# Patient Record
Sex: FEMALE | Race: WHITE | URBAN - METROPOLITAN AREA
[De-identification: names, ages, dates, MRNs, and addresses within clinical notes are randomized per-mention and may not be internally consistent; named-entity substitution may affect disease eponyms.]

---

## 2017-07-19 RX ORDER — ALBUTEROL SULFATE 90 UG/1
2 AEROSOL, METERED RESPIRATORY (INHALATION) EVERY 4 HOURS PRN
COMMUNITY

## 2017-07-19 RX ORDER — SUMATRIPTAN 6 MG/.5ML
6 INJECTION, SOLUTION SUBCUTANEOUS EVERY 4 HOURS PRN
COMMUNITY

## 2017-07-19 RX ORDER — IBUPROFEN 800 MG/1
1600 TABLET, FILM COATED ORAL EVERY 6 HOURS PRN
COMMUNITY

## 2017-07-19 RX ORDER — ACETAMINOPHEN 500 MG
1000 TABLET ORAL EVERY 6 HOURS PRN
COMMUNITY

## 2017-07-19 RX ORDER — PANTOPRAZOLE SODIUM 40 MG/1
40 TABLET, DELAYED RELEASE ORAL DAILY
COMMUNITY

## 2017-07-19 NOTE — PHARMACY-ADMISSION MEDICATION HISTORY
Admission medication completed by pre-admitting RN. Called patient to check doses, ended up making several changes. Updated list below.  See [facility] admission navigator for allergy information, prior to admission medications and immunization status.     Medication history interview source(s):Patient  Medication history resources (including written lists, pill bottles, clinic record): [facility] list.   Primary pharmacy: [facility] in [place], [place].    Changes made to PTA medication list:  Added: Zonisamide, Imitrex sq, Protonix, Albuterol, Tylenol.  Deleted: Depakote, Omeprazole, Imitrex po  Changed: Ibuprofen    Actions taken by pharmacist (provider contacted, etc):Called Patient at home.     Additional medication history information:None    Medication reconciliation/reorder completed by provider prior to medication history? No      Prior to Admission medications    Medication Sig Last Dose Taking? Auth Provider   ZONISAMIDE PO Take 25 mg by mouth 2 times daily  Yes Unknown, Entered By History   SUMAtriptan (IMITREX) 6 MG/0.5ML injection Inject 6 mg Subcutaneous every 4 hours as needed for migraine (Max 2 doses in 24 hours)  Yes Unknown, Entered By History   ibuprofen (ADVIL/MOTRIN) 800 MG tablet Take 1,600 mg by mouth every 6 hours as needed for moderate pain Yes, patient said she was taking 2 x 800 mg tablets, per her MD. Counseled that 800 mg is usual maximum dose.  Yes Unknown, Entered By History   pantoprazole (PROTONIX) 40 MG EC tablet Take 40 mg by mouth daily  Yes Unknown, Entered By History   albuterol (PROAIR HFA/PROVENTIL HFA/VENTOLIN HFA) 108 (90 BASE) MCG/ACT Inhaler Inhale 2 puffs into the lungs every 4 hours as needed for shortness of breath / dyspnea or wheezing  Yes Unknown, Entered By History   acetaminophen (TYLENOL) 500 MG tablet Take 1,000 mg by mouth every 6 hours as needed for mild pain Max 4000 mg per day from all sources.  Yes Unknown, Entered By History

## 2017-07-21 ENCOUNTER — APPOINTMENT (OUTPATIENT)
Dept: GENERAL RADIOLOGY | Facility: CLINIC | Age: 52
End: 2017-07-21
Attending: ORTHOPAEDIC SURGERY
Payer: MEDICAID

## 2017-07-21 ENCOUNTER — ANESTHESIA EVENT (OUTPATIENT)
Dept: SURGERY | Facility: CLINIC | Age: 52
End: 2017-07-21
Payer: MEDICAID

## 2017-07-21 ENCOUNTER — ANESTHESIA (OUTPATIENT)
Dept: SURGERY | Facility: CLINIC | Age: 52
End: 2017-07-21
Payer: MEDICAID

## 2017-07-21 ENCOUNTER — HOSPITAL ENCOUNTER (OUTPATIENT)
Facility: CLINIC | Age: 52
Setting detail: OBSERVATION
Discharge: HOME | End: 2017-07-22
Attending: ORTHOPAEDIC SURGERY | Admitting: ORTHOPAEDIC SURGERY
Payer: MEDICAID

## 2017-07-21 DIAGNOSIS — I10 ESSENTIAL HYPERTENSION: ICD-10-CM

## 2017-07-21 DIAGNOSIS — M43.22 FUSION OF SPINE OF CERVICAL REGION: Primary | ICD-10-CM

## 2017-07-21 DIAGNOSIS — E78.2 MIXED HYPERLIPIDEMIA: ICD-10-CM

## 2017-07-21 LAB
ABO + RH BLD: NORMAL
ABO + RH BLD: NORMAL
BLD GP AB SCN SERPL QL: NORMAL
BLOOD BANK CMNT PATIENT-IMP: NORMAL
SPECIMEN EXP DATE BLD: NORMAL

## 2017-07-21 PROCEDURE — 93005 ELECTROCARDIOGRAM TRACING: CPT

## 2017-07-21 PROCEDURE — 25000128 H RX IP 250 OP 636: Performed by: INTERNAL MEDICINE

## 2017-07-21 PROCEDURE — 36415 COLL VENOUS BLD VENIPUNCTURE: CPT | Performed by: ANESTHESIOLOGY

## 2017-07-21 PROCEDURE — 25000128 H RX IP 250 OP 636: Performed by: ORTHOPAEDIC SURGERY

## 2017-07-21 PROCEDURE — 99219 ZZC INITIAL OBSERVATION CARE,LEVL II: CPT | Performed by: HOSPITALIST

## 2017-07-21 PROCEDURE — 99207 ZZC CDG-CODE CATEGORY CHANGED: CPT | Performed by: HOSPITALIST

## 2017-07-21 PROCEDURE — 25000128 H RX IP 250 OP 636: Performed by: NURSE ANESTHETIST, CERTIFIED REGISTERED

## 2017-07-21 PROCEDURE — 36000071 ZZH SURGERY LEVEL 5 W FLUORO 1ST 30 MIN: Performed by: ORTHOPAEDIC SURGERY

## 2017-07-21 PROCEDURE — 25000125 ZZHC RX 250: Performed by: ORTHOPAEDIC SURGERY

## 2017-07-21 PROCEDURE — 25000128 H RX IP 250 OP 636: Performed by: ANESTHESIOLOGY

## 2017-07-21 PROCEDURE — 25000125 ZZHC RX 250: Performed by: NURSE ANESTHETIST, CERTIFIED REGISTERED

## 2017-07-21 PROCEDURE — 25000566 ZZH SEVOFLURANE, EA 15 MIN: Performed by: ORTHOPAEDIC SURGERY

## 2017-07-21 PROCEDURE — 25000132 ZZH RX MED GY IP 250 OP 250 PS 637: Performed by: ORTHOPAEDIC SURGERY

## 2017-07-21 PROCEDURE — 40000277 XR SURGERY CARM FLUORO LESS THAN 5 MIN W STILLS: Mod: TC

## 2017-07-21 PROCEDURE — 40000306 ZZH STATISTIC PRE PROC ASSESS II: Performed by: ORTHOPAEDIC SURGERY

## 2017-07-21 PROCEDURE — 71000012 ZZH RECOVERY PHASE 1 LEVEL 1 FIRST HR: Performed by: ORTHOPAEDIC SURGERY

## 2017-07-21 PROCEDURE — 37000008 ZZH ANESTHESIA TECHNICAL FEE, 1ST 30 MIN: Performed by: ORTHOPAEDIC SURGERY

## 2017-07-21 PROCEDURE — 71000013 ZZH RECOVERY PHASE 1 LEVEL 1 EA ADDTL HR: Performed by: ORTHOPAEDIC SURGERY

## 2017-07-21 PROCEDURE — 86900 BLOOD TYPING SEROLOGIC ABO: CPT | Performed by: ANESTHESIOLOGY

## 2017-07-21 PROCEDURE — C1762 CONN TISS, HUMAN(INC FASCIA): HCPCS | Performed by: ORTHOPAEDIC SURGERY

## 2017-07-21 PROCEDURE — 27210995 ZZH RX 272: Performed by: ORTHOPAEDIC SURGERY

## 2017-07-21 PROCEDURE — 86850 RBC ANTIBODY SCREEN: CPT | Performed by: ANESTHESIOLOGY

## 2017-07-21 PROCEDURE — 36000069 ZZH SURGERY LEVEL 5 EA 15 ADDTL MIN: Performed by: ORTHOPAEDIC SURGERY

## 2017-07-21 PROCEDURE — 40000275 ZZH STATISTIC RCP TIME EA 10 MIN

## 2017-07-21 PROCEDURE — 37000009 ZZH ANESTHESIA TECHNICAL FEE, EACH ADDTL 15 MIN: Performed by: ORTHOPAEDIC SURGERY

## 2017-07-21 PROCEDURE — 93010 ELECTROCARDIOGRAM REPORT: CPT | Performed by: INTERNAL MEDICINE

## 2017-07-21 PROCEDURE — G0378 HOSPITAL OBSERVATION PER HR: HCPCS

## 2017-07-21 PROCEDURE — 27210794 ZZH OR GENERAL SUPPLY STERILE: Performed by: ORTHOPAEDIC SURGERY

## 2017-07-21 PROCEDURE — 25000125 ZZHC RX 250: Performed by: ANESTHESIOLOGY

## 2017-07-21 PROCEDURE — 86901 BLOOD TYPING SEROLOGIC RH(D): CPT | Performed by: ANESTHESIOLOGY

## 2017-07-21 PROCEDURE — C1713 ANCHOR/SCREW BN/BN,TIS/BN: HCPCS | Performed by: ORTHOPAEDIC SURGERY

## 2017-07-21 DEVICE — GRAFT BONE PUTTY DBX 01ML 038010: Type: IMPLANTABLE DEVICE | Site: SPINE CERVICAL | Status: FUNCTIONAL

## 2017-07-21 RX ORDER — ACETAMINOPHEN 10 MG/ML
1000 INJECTION, SOLUTION INTRAVENOUS ONCE
Status: COMPLETED | OUTPATIENT
Start: 2017-07-21 | End: 2017-07-21

## 2017-07-21 RX ORDER — KETAMINE HYDROCHLORIDE 50 MG/ML
INJECTION, SOLUTION INTRAMUSCULAR; INTRAVENOUS PRN
Status: DISCONTINUED | OUTPATIENT
Start: 2017-07-21 | End: 2017-07-21

## 2017-07-21 RX ORDER — CEFAZOLIN SODIUM 1 G/3ML
1 INJECTION, POWDER, FOR SOLUTION INTRAMUSCULAR; INTRAVENOUS SEE ADMIN INSTRUCTIONS
Status: DISCONTINUED | OUTPATIENT
Start: 2017-07-21 | End: 2017-07-21 | Stop reason: HOSPADM

## 2017-07-21 RX ORDER — ACETAMINOPHEN 325 MG/1
975 TABLET ORAL EVERY 8 HOURS
Status: DISCONTINUED | OUTPATIENT
Start: 2017-07-21 | End: 2017-07-22 | Stop reason: HOSPADM

## 2017-07-21 RX ORDER — LIDOCAINE HYDROCHLORIDE 10 MG/ML
INJECTION, SOLUTION INFILTRATION; PERINEURAL PRN
Status: DISCONTINUED | OUTPATIENT
Start: 2017-07-21 | End: 2017-07-21

## 2017-07-21 RX ORDER — PROCHLORPERAZINE 25 MG
25 SUPPOSITORY, RECTAL RECTAL EVERY 12 HOURS PRN
Status: DISCONTINUED | OUTPATIENT
Start: 2017-07-21 | End: 2017-07-22 | Stop reason: HOSPADM

## 2017-07-21 RX ORDER — OXYCODONE HYDROCHLORIDE 5 MG/1
5-10 TABLET ORAL
Status: DISCONTINUED | OUTPATIENT
Start: 2017-07-21 | End: 2017-07-22 | Stop reason: HOSPADM

## 2017-07-21 RX ORDER — ONDANSETRON 4 MG/1
4 TABLET, ORALLY DISINTEGRATING ORAL EVERY 6 HOURS PRN
Status: DISCONTINUED | OUTPATIENT
Start: 2017-07-21 | End: 2017-07-22 | Stop reason: HOSPADM

## 2017-07-21 RX ORDER — MORPHINE SULFATE 2 MG/ML
2-4 INJECTION, SOLUTION INTRAMUSCULAR; INTRAVENOUS
Status: DISCONTINUED | OUTPATIENT
Start: 2017-07-21 | End: 2017-07-21

## 2017-07-21 RX ORDER — PROCHLORPERAZINE MALEATE 5 MG
5-10 TABLET ORAL EVERY 6 HOURS PRN
Status: DISCONTINUED | OUTPATIENT
Start: 2017-07-21 | End: 2017-07-22 | Stop reason: HOSPADM

## 2017-07-21 RX ORDER — CEFAZOLIN SODIUM 2 G/100ML
2 INJECTION, SOLUTION INTRAVENOUS
Status: COMPLETED | OUTPATIENT
Start: 2017-07-21 | End: 2017-07-21

## 2017-07-21 RX ORDER — SODIUM CHLORIDE, SODIUM LACTATE, POTASSIUM CHLORIDE, CALCIUM CHLORIDE 600; 310; 30; 20 MG/100ML; MG/100ML; MG/100ML; MG/100ML
INJECTION, SOLUTION INTRAVENOUS CONTINUOUS
Status: DISCONTINUED | OUTPATIENT
Start: 2017-07-21 | End: 2017-07-21 | Stop reason: HOSPADM

## 2017-07-21 RX ORDER — BUPIVACAINE HYDROCHLORIDE AND EPINEPHRINE 2.5; 5 MG/ML; UG/ML
INJECTION, SOLUTION EPIDURAL; INFILTRATION; INTRACAUDAL; PERINEURAL PRN
Status: DISCONTINUED | OUTPATIENT
Start: 2017-07-21 | End: 2017-07-21 | Stop reason: HOSPADM

## 2017-07-21 RX ORDER — NALOXONE HYDROCHLORIDE 0.4 MG/ML
.1-.4 INJECTION, SOLUTION INTRAMUSCULAR; INTRAVENOUS; SUBCUTANEOUS
Status: DISCONTINUED | OUTPATIENT
Start: 2017-07-21 | End: 2017-07-22 | Stop reason: HOSPADM

## 2017-07-21 RX ORDER — HYDROMORPHONE HYDROCHLORIDE 1 MG/ML
.3-.5 INJECTION, SOLUTION INTRAMUSCULAR; INTRAVENOUS; SUBCUTANEOUS
Status: DISCONTINUED | OUTPATIENT
Start: 2017-07-21 | End: 2017-07-22 | Stop reason: HOSPADM

## 2017-07-21 RX ORDER — LIDOCAINE 40 MG/G
CREAM TOPICAL
Status: DISCONTINUED | OUTPATIENT
Start: 2017-07-21 | End: 2017-07-22 | Stop reason: HOSPADM

## 2017-07-21 RX ORDER — SUMATRIPTAN 6 MG/.5ML
6 INJECTION, SOLUTION SUBCUTANEOUS EVERY 4 HOURS PRN
Status: DISCONTINUED | OUTPATIENT
Start: 2017-07-21 | End: 2017-07-22 | Stop reason: HOSPADM

## 2017-07-21 RX ORDER — ONDANSETRON 2 MG/ML
4 INJECTION INTRAMUSCULAR; INTRAVENOUS EVERY 6 HOURS PRN
Status: DISCONTINUED | OUTPATIENT
Start: 2017-07-21 | End: 2017-07-22 | Stop reason: HOSPADM

## 2017-07-21 RX ORDER — FENTANYL CITRATE 50 UG/ML
25-50 INJECTION, SOLUTION INTRAMUSCULAR; INTRAVENOUS
Status: DISCONTINUED | OUTPATIENT
Start: 2017-07-21 | End: 2017-07-21 | Stop reason: HOSPADM

## 2017-07-21 RX ORDER — FENTANYL CITRATE 50 UG/ML
INJECTION, SOLUTION INTRAMUSCULAR; INTRAVENOUS PRN
Status: DISCONTINUED | OUTPATIENT
Start: 2017-07-21 | End: 2017-07-21

## 2017-07-21 RX ORDER — NEOSTIGMINE METHYLSULFATE 1 MG/ML
VIAL (ML) INJECTION PRN
Status: DISCONTINUED | OUTPATIENT
Start: 2017-07-21 | End: 2017-07-21

## 2017-07-21 RX ORDER — ACETAMINOPHEN 325 MG/1
650 TABLET ORAL EVERY 4 HOURS PRN
Status: DISCONTINUED | OUTPATIENT
Start: 2017-07-24 | End: 2017-07-22 | Stop reason: HOSPADM

## 2017-07-21 RX ORDER — KETOROLAC TROMETHAMINE 30 MG/ML
INJECTION, SOLUTION INTRAMUSCULAR; INTRAVENOUS PRN
Status: DISCONTINUED | OUTPATIENT
Start: 2017-07-21 | End: 2017-07-21

## 2017-07-21 RX ORDER — HYDRALAZINE HYDROCHLORIDE 20 MG/ML
10 INJECTION INTRAMUSCULAR; INTRAVENOUS EVERY 4 HOURS PRN
Status: DISCONTINUED | OUTPATIENT
Start: 2017-07-21 | End: 2017-07-22 | Stop reason: HOSPADM

## 2017-07-21 RX ORDER — DEXAMETHASONE SODIUM PHOSPHATE 4 MG/ML
INJECTION, SOLUTION INTRA-ARTICULAR; INTRALESIONAL; INTRAMUSCULAR; INTRAVENOUS; SOFT TISSUE PRN
Status: DISCONTINUED | OUTPATIENT
Start: 2017-07-21 | End: 2017-07-21

## 2017-07-21 RX ORDER — PROPOFOL 10 MG/ML
INJECTION, EMULSION INTRAVENOUS PRN
Status: DISCONTINUED | OUTPATIENT
Start: 2017-07-21 | End: 2017-07-21

## 2017-07-21 RX ORDER — LORAZEPAM 2 MG/ML
0.5 INJECTION INTRAMUSCULAR EVERY 6 HOURS PRN
Status: DISCONTINUED | OUTPATIENT
Start: 2017-07-21 | End: 2017-07-22 | Stop reason: HOSPADM

## 2017-07-21 RX ORDER — CYCLOBENZAPRINE HCL 10 MG
10 TABLET ORAL 3 TIMES DAILY PRN
Status: DISCONTINUED | OUTPATIENT
Start: 2017-07-21 | End: 2017-07-22 | Stop reason: HOSPADM

## 2017-07-21 RX ORDER — ONDANSETRON 4 MG/1
4 TABLET, ORALLY DISINTEGRATING ORAL EVERY 30 MIN PRN
Status: DISCONTINUED | OUTPATIENT
Start: 2017-07-21 | End: 2017-07-21 | Stop reason: HOSPADM

## 2017-07-21 RX ORDER — PROPOFOL 10 MG/ML
INJECTION, EMULSION INTRAVENOUS CONTINUOUS PRN
Status: DISCONTINUED | OUTPATIENT
Start: 2017-07-21 | End: 2017-07-21

## 2017-07-21 RX ORDER — GLYCOPYRROLATE 0.2 MG/ML
INJECTION, SOLUTION INTRAMUSCULAR; INTRAVENOUS PRN
Status: DISCONTINUED | OUTPATIENT
Start: 2017-07-21 | End: 2017-07-21

## 2017-07-21 RX ORDER — LIDOCAINE 40 MG/G
CREAM TOPICAL
Status: DISCONTINUED | OUTPATIENT
Start: 2017-07-21 | End: 2017-07-21 | Stop reason: HOSPADM

## 2017-07-21 RX ORDER — ONDANSETRON 2 MG/ML
4 INJECTION INTRAMUSCULAR; INTRAVENOUS EVERY 30 MIN PRN
Status: DISCONTINUED | OUTPATIENT
Start: 2017-07-21 | End: 2017-07-21 | Stop reason: HOSPADM

## 2017-07-21 RX ORDER — ALBUTEROL SULFATE 90 UG/1
2 AEROSOL, METERED RESPIRATORY (INHALATION) EVERY 4 HOURS PRN
Status: DISCONTINUED | OUTPATIENT
Start: 2017-07-21 | End: 2017-07-22 | Stop reason: HOSPADM

## 2017-07-21 RX ORDER — SODIUM CHLORIDE AND POTASSIUM CHLORIDE 150; 900 MG/100ML; MG/100ML
INJECTION, SOLUTION INTRAVENOUS CONTINUOUS
Status: DISCONTINUED | OUTPATIENT
Start: 2017-07-21 | End: 2017-07-22 | Stop reason: HOSPADM

## 2017-07-21 RX ORDER — ZONISAMIDE 25 MG/1
25 CAPSULE ORAL 2 TIMES DAILY
Status: DISCONTINUED | OUTPATIENT
Start: 2017-07-21 | End: 2017-07-22 | Stop reason: HOSPADM

## 2017-07-21 RX ORDER — ONDANSETRON 2 MG/ML
INJECTION INTRAMUSCULAR; INTRAVENOUS PRN
Status: DISCONTINUED | OUTPATIENT
Start: 2017-07-21 | End: 2017-07-21

## 2017-07-21 RX ORDER — PANTOPRAZOLE SODIUM 40 MG/1
40 TABLET, DELAYED RELEASE ORAL DAILY
Status: DISCONTINUED | OUTPATIENT
Start: 2017-07-21 | End: 2017-07-22 | Stop reason: HOSPADM

## 2017-07-21 RX ADMIN — OXYCODONE HYDROCHLORIDE 10 MG: 5 TABLET ORAL at 19:01

## 2017-07-21 RX ADMIN — ACETAMINOPHEN 1000 MG: 10 INJECTION, SOLUTION INTRAVENOUS at 11:56

## 2017-07-21 RX ADMIN — POTASSIUM CHLORIDE AND SODIUM CHLORIDE: 900; 150 INJECTION, SOLUTION INTRAVENOUS at 14:16

## 2017-07-21 RX ADMIN — FENTANYL CITRATE 100 MCG: 50 INJECTION, SOLUTION INTRAMUSCULAR; INTRAVENOUS at 08:53

## 2017-07-21 RX ADMIN — ONDANSETRON 4 MG: 2 INJECTION INTRAMUSCULAR; INTRAVENOUS at 16:38

## 2017-07-21 RX ADMIN — SODIUM CHLORIDE, POTASSIUM CHLORIDE, SODIUM LACTATE AND CALCIUM CHLORIDE: 600; 310; 30; 20 INJECTION, SOLUTION INTRAVENOUS at 07:40

## 2017-07-21 RX ADMIN — ACETAMINOPHEN 975 MG: 325 TABLET, FILM COATED ORAL at 14:16

## 2017-07-21 RX ADMIN — PROPOFOL 150 MCG/KG/MIN: 10 INJECTION, EMULSION INTRAVENOUS at 09:03

## 2017-07-21 RX ADMIN — FENTANYL CITRATE 50 MCG: 50 INJECTION INTRAMUSCULAR; INTRAVENOUS at 12:43

## 2017-07-21 RX ADMIN — ONDANSETRON 4 MG: 2 INJECTION INTRAMUSCULAR; INTRAVENOUS at 11:03

## 2017-07-21 RX ADMIN — PROCHLORPERAZINE EDISYLATE 10 MG: 5 INJECTION INTRAMUSCULAR; INTRAVENOUS at 19:51

## 2017-07-21 RX ADMIN — CYCLOBENZAPRINE HYDROCHLORIDE 10 MG: 10 TABLET, FILM COATED ORAL at 17:21

## 2017-07-21 RX ADMIN — KETAMINE HYDROCHLORIDE 20 MG: 50 INJECTION, SOLUTION INTRAMUSCULAR; INTRAVENOUS at 11:08

## 2017-07-21 RX ADMIN — OXYCODONE HYDROCHLORIDE 10 MG: 5 TABLET ORAL at 22:07

## 2017-07-21 RX ADMIN — FENTANYL CITRATE 50 MCG: 50 INJECTION INTRAMUSCULAR; INTRAVENOUS at 11:42

## 2017-07-21 RX ADMIN — PROPOFOL 100 MCG/KG/MIN: 10 INJECTION, EMULSION INTRAVENOUS at 09:48

## 2017-07-21 RX ADMIN — CEFAZOLIN SODIUM 2 G: 2 INJECTION, SOLUTION INTRAVENOUS at 08:46

## 2017-07-21 RX ADMIN — KETOROLAC TROMETHAMINE 30 MG: 30 INJECTION, SOLUTION INTRAMUSCULAR at 11:15

## 2017-07-21 RX ADMIN — SODIUM CHLORIDE, POTASSIUM CHLORIDE, SODIUM LACTATE AND CALCIUM CHLORIDE: 600; 310; 30; 20 INJECTION, SOLUTION INTRAVENOUS at 09:04

## 2017-07-21 RX ADMIN — LIDOCAINE HYDROCHLORIDE 40 MG: 10 INJECTION, SOLUTION INFILTRATION; PERINEURAL at 08:53

## 2017-07-21 RX ADMIN — HYDROMORPHONE HYDROCHLORIDE 0.5 MG: 1 INJECTION, SOLUTION INTRAMUSCULAR; INTRAVENOUS; SUBCUTANEOUS at 11:40

## 2017-07-21 RX ADMIN — ACETAMINOPHEN 975 MG: 325 TABLET, FILM COATED ORAL at 22:07

## 2017-07-21 RX ADMIN — OXYCODONE HYDROCHLORIDE 5 MG: 5 TABLET ORAL at 15:52

## 2017-07-21 RX ADMIN — ZONISAMIDE 25 MG: 25 CAPSULE ORAL at 14:56

## 2017-07-21 RX ADMIN — MORPHINE SULFATE 2 MG: 2 INJECTION, SOLUTION INTRAMUSCULAR; INTRAVENOUS at 14:49

## 2017-07-21 RX ADMIN — HYDROMORPHONE HYDROCHLORIDE 0.5 MG: 1 INJECTION, SOLUTION INTRAMUSCULAR; INTRAVENOUS; SUBCUTANEOUS at 13:03

## 2017-07-21 RX ADMIN — OXYCODONE HYDROCHLORIDE 5 MG: 5 TABLET ORAL at 15:55

## 2017-07-21 RX ADMIN — CEFAZOLIN 1 G: 1 INJECTION, POWDER, FOR SOLUTION INTRAMUSCULAR; INTRAVENOUS at 10:46

## 2017-07-21 RX ADMIN — ROCURONIUM BROMIDE 20 MG: 10 INJECTION INTRAVENOUS at 09:20

## 2017-07-21 RX ADMIN — MIDAZOLAM HYDROCHLORIDE 2 MG: 1 INJECTION, SOLUTION INTRAMUSCULAR; INTRAVENOUS at 08:46

## 2017-07-21 RX ADMIN — HYDROMORPHONE HYDROCHLORIDE 1 MG: 1 INJECTION, SOLUTION INTRAMUSCULAR; INTRAVENOUS; SUBCUTANEOUS at 09:47

## 2017-07-21 RX ADMIN — GLYCOPYRROLATE 0.2 MG: 0.2 INJECTION, SOLUTION INTRAMUSCULAR; INTRAVENOUS at 08:53

## 2017-07-21 RX ADMIN — MORPHINE SULFATE 4 MG: 2 INJECTION, SOLUTION INTRAMUSCULAR; INTRAVENOUS at 17:20

## 2017-07-21 RX ADMIN — PROPOFOL 150 MG: 10 INJECTION, EMULSION INTRAVENOUS at 08:53

## 2017-07-21 RX ADMIN — ROCURONIUM BROMIDE 40 MG: 10 INJECTION INTRAVENOUS at 08:53

## 2017-07-21 RX ADMIN — PANTOPRAZOLE SODIUM 40 MG: 40 TABLET, DELAYED RELEASE ORAL at 14:16

## 2017-07-21 RX ADMIN — FENTANYL CITRATE 50 MCG: 50 INJECTION INTRAMUSCULAR; INTRAVENOUS at 12:21

## 2017-07-21 RX ADMIN — Medication 3 MG: at 11:12

## 2017-07-21 RX ADMIN — FENTANYL CITRATE 150 MCG: 50 INJECTION, SOLUTION INTRAMUSCULAR; INTRAVENOUS at 09:33

## 2017-07-21 RX ADMIN — ZONISAMIDE 25 MG: 25 CAPSULE ORAL at 22:07

## 2017-07-21 RX ADMIN — Medication 0.5 MG: at 21:05

## 2017-07-21 RX ADMIN — DEXAMETHASONE SODIUM PHOSPHATE 4 MG: 4 INJECTION, SOLUTION INTRA-ARTICULAR; INTRALESIONAL; INTRAMUSCULAR; INTRAVENOUS; SOFT TISSUE at 08:53

## 2017-07-21 RX ADMIN — GLYCOPYRROLATE 0.4 MG: 0.2 INJECTION, SOLUTION INTRAMUSCULAR; INTRAVENOUS at 11:12

## 2017-07-21 RX ADMIN — FENTANYL CITRATE 50 MCG: 50 INJECTION, SOLUTION INTRAMUSCULAR; INTRAVENOUS at 11:27

## 2017-07-21 ASSESSMENT — PAIN DESCRIPTION - DESCRIPTORS: DESCRIPTORS: CONSTANT

## 2017-07-21 ASSESSMENT — LIFESTYLE VARIABLES: TOBACCO_USE: 1

## 2017-07-21 NOTE — IP AVS SNAPSHOT
MRN:[id number]                      After Visit Summary   7/21/2017    Brittney Roman    MRN: [id number]           Thank you!     Thank you for choosing [facility] for your care. Our goal is always to provide you with excellent care. Hearing back from our patients is one way we can continue to improve our services. Please take a few minutes to complete the written survey that you may receive in the mail after you visit. If you would like to speak to someone directly about your visit please contact Patient Relations at [phone]. Thank you!          Patient Information     Date Of Birth          1965        Designated Caregiver       Most Recent Value    Caregiver    Will someone help with your care after discharge? yes    Name of designated caregiver Juan boyfriend, lives with pt    Phone number of caregiver [phone]    Caregiver address same as pt      About your hospital stay     You were admitted on:  July 21, 2017 You last received care in the:  [facility]    You were discharged on:  July 22, 2017        Reason for your hospital stay       Cervical fusion                  Who to Call     For medical emergencies, please call 911.  For non-urgent questions about your medical care, please call your primary care provider or clinic, [phone]  For questions related to your surgery, please call your surgery clinic        Attending Provider     Provider Specialty    Cirilo Rajput MD Orthopedics    ChaseBryan molina MD Family Practice       Primary Care Provider Office Phone # Fax #    Tino Hayes [phone]      After Care Instructions     Diet       Follow this diet upon discharge: Regular                  Follow-up Appointments     Follow-up and recommended labs and tests        Follow up with Dr. rajput , at (location with clinic name or city) [facility], within 3 weeks   to evaluate after surgery. No follow up labs or test are needed.          "         Pending Results     Date and Time Order Name Status Description    2017 1510 EKG 12-lead, tracing only Preliminary             Statement of Approval     Ordered          17 9446  I have reviewed and agree with all the recommendations and orders detailed in this document.  EFFECTIVE NOW     Approved and electronically signed by:  Cirilo Rajput MD             Admission Information     Date & Time Provider Department Dept. Phone    2017 Bryan Chase MD [facility] [phone]      Your Vitals Were     Blood Pressure Temperature Respirations Height Weight Pulse Oximetry    106/64 (BP Location: Left arm) 98.5  F (36.9  C) (Oral) 16 1.676 m (5' 6\") 74.4 kg (164 lb) 96%    BMI (Body Mass Index)                   26.47 kg/m2           [employer] Information     [employer] lets you send messages to your doctor, view your test results, renew your prescriptions, schedule appointments and more. To sign up, go to [email or web address] . Click on \"Log in\" on the left side of the screen, which will take you to the Welcome page. Then click on \"Sign up Now\" on the right side of the page.     You will be asked to enter the access code listed below, as well as some personal information. Please follow the directions to create your username and password.     Your access code is: [email or web address]  Expires: 10/20/2017  8:44 AM     Your access code will  in 90 days. If you need help or a new code, please call your [place] clinic or [phone].        Care EveryWhere ID     This is your Care EveryWhere ID. This could be used by other organizations to access your [place] medical records  [id number]        Equal Access to Services     [facility] AH: Hadii magda Potts, wajoeda luqadaha, qaybta kaalmada adeaguedayada, shaheed chatman. So [facility] [phone].    ATENCIÓN: Si habla español, tiene a damon disposición servicios gratuitos de asistencia lingüística. " Silke cintron [phone].    We comply with applicable federal civil rights laws and [place] laws. We do not discriminate on the basis of race, color, national origin, age, disability sex, sexual orientation or gender identity.               Review of your medicines      START taking        Dose / Directions    amLODIPine 10 MG tablet   Commonly known as:  NORVASC   Used for:  Essential hypertension   Notes to Patient:  NEW SCRIPT        Dose:  5 mg   Take 0.5 tablets (5 mg) by mouth daily   Quantity:  30 tablet   Refills:  1       atorvastatin 20 MG tablet   Commonly known as:  LIPITOR   Used for:  Mixed hyperlipidemia   Notes to Patient:  NEW SCRIPT        Dose:  20 mg   Take 1 tablet (20 mg) by mouth daily   Quantity:  30 tablet   Refills:  1       oxyCODONE 5 MG IR tablet   Commonly known as:  ROXICODONE        Dose:  5-10 mg   Take 1-2 tablets (5-10 mg) by mouth every 4 hours as needed for moderate to severe pain   Quantity:  40 tablet   Refills:  0         CONTINUE these medicines which have NOT CHANGED        Dose / Directions    acetaminophen 500 MG tablet   Commonly known as:  TYLENOL        Dose:  1000 mg   Take 1,000 mg by mouth every 6 hours as needed for mild pain Max 4000 mg per day from all sources.   Refills:  0       albuterol 108 (90 BASE) MCG/ACT Inhaler   Commonly known as:  PROAIR HFA/PROVENTIL HFA/VENTOLIN HFA        Dose:  2 puff   Inhale 2 puffs into the lungs every 4 hours as needed for shortness of breath / dyspnea or wheezing   Refills:  0       ibuprofen 800 MG tablet   Commonly known as:  ADVIL/MOTRIN        Dose:  1600 mg   Take 1,600 mg by mouth every 6 hours as needed for moderate pain Yes, patient said she was taking 2 x 800 mg tablets, per her MD. Counseled that 800 mg is usual maximum dose.   Refills:  0       PROTONIX 40 MG EC tablet   Generic drug:  pantoprazole        Dose:  40 mg   Take 40 mg by mouth daily   Refills:  0       SUMAtriptan 6 MG/0.5ML injection   Commonly known  as:  IMITREX        Dose:  6 mg   Inject 6 mg Subcutaneous every 4 hours as needed for migraine (Max 2 doses in 24 hours)   Refills:  0       ZONISAMIDE PO        Dose:  25 mg   Take 25 mg by mouth 2 times daily   Refills:  0            Where to get your medicines      Some of these will need a paper prescription and others can be bought over the counter. Ask your nurse if you have questions.     Bring a paper prescription for each of these medications     amLODIPine 10 MG tablet    atorvastatin 20 MG tablet    oxyCODONE 5 MG IR tablet                Protect others around you: Learn how to safely use, store and throw away your medicines at www.disposemymeds.org.             Medication List: This is a list of all your medications and when to take them. Check marks below indicate your daily home schedule. Keep this list as a reference.      Medications           Morning Afternoon Evening Bedtime As Needed    acetaminophen 500 MG tablet   Commonly known as:  TYLENOL   Take 1,000 mg by mouth every 6 hours as needed for mild pain Max 4000 mg per day from all sources.   Last time this was given:  975 mg on 7/22/2017  1:07 PM   Next Dose Due:  10pm                                albuterol 108 (90 BASE) MCG/ACT Inhaler   Commonly known as:  PROAIR HFA/PROVENTIL HFA/VENTOLIN HFA   Inhale 2 puffs into the lungs every 4 hours as needed for shortness of breath / dyspnea or wheezing                                amLODIPine 10 MG tablet   Commonly known as:  NORVASC   Take 0.5 tablets (5 mg) by mouth daily   Notes to Patient:  NEW SCRIPT                                atorvastatin 20 MG tablet   Commonly known as:  LIPITOR   Take 1 tablet (20 mg) by mouth daily   Notes to Patient:  NEW SCRIPT                                ibuprofen 800 MG tablet   Commonly known as:  ADVIL/MOTRIN   Take 1,600 mg by mouth every 6 hours as needed for moderate pain Yes, patient said she was taking 2 x 800 mg tablets, per her MD. Counseled that 800  mg is usual maximum dose.                                oxyCODONE 5 MG IR tablet   Commonly known as:  ROXICODONE   Take 1-2 tablets (5-10 mg) by mouth every 4 hours as needed for moderate to severe pain   Last time this was given:  10 mg on 7/22/2017  2:34 PM   Next Dose Due:  6:30pm                                PROTONIX 40 MG EC tablet   Take 40 mg by mouth daily   Last time this was given:  40 mg on 7/22/2017  7:51 AM   Generic drug:  pantoprazole   Next Dose Due:  7/23                                SUMAtriptan 6 MG/0.5ML injection   Commonly known as:  IMITREX   Inject 6 mg Subcutaneous every 4 hours as needed for migraine (Max 2 doses in 24 hours)                                ZONISAMIDE PO   Take 25 mg by mouth 2 times daily   Last time this was given:  25 mg on 7/22/2017  6:01 AM   Next Dose Due:  8pm

## 2017-07-21 NOTE — PLAN OF CARE
"Problem: Goal Outcome Summary  Goal: Goal Outcome Summary  Pt has bad migraine. Has a hx of this. +nausea, dry heaving, light and noise sensative. RN heard pt in room dry heaving and crying in pain. Rates this \"as bad as it gets\" received zonegran already on end of day shift. Pt states she can not take imitrex due to she has every side effect of that drug and her neurologist said try not to take this. (including fast heart rate). When its like this \"she would go into her ER in [place], [place] and receive a \"shot in the buttocks\" and load up with tylenol and motrin\" closed curtains, gave pt ice pack, ordered heating pad . Ashia LEVI. Gave pt diet kashmir, (she drinks coke at home). She does not recall the name of the medication she receives in the ER  Gave flexeril and morphine as well Oxycodone   1930 pt vomited 100cc. Migraine and neck pain continues. Pt thinks the morphine is making her nauseated. Can we have additional anti nausea meds and something different for pain,instead of the morphine iv      Lungs---clear, but diminished, pt is a 1/2 pk per day smoker. On room air, -cough, using IS upto 3000. Cont pulse ox on   BS---hypo bs, had bm today before surgery. Pt states she is +flatus, nausea and vomiting. Vomited 100cc. Related to migraine or medications??? Pt thinks its the morphine. Changed over to dilaudid. Taking zofran and compazine Diet---full liq diet  ---leonard in placed, adequate  CMS---numbness in L hand 3rd, 4th and 5th fingertips. +pp and radial pulses, strong hand grasp. Anterior cervical neck drsg dry and intact. Cervical collar on.   Pain---pain level a 6-10 for migraine and for neck pain. Taking oxycodone, morphine, flexeril, and zofran. Pt refuses imitrex due to above. Compazine, ativan and dilaudid available as well.   Activity---sat up in chair for her supper. Ate 1/2 oatmeal, 100% apple juice and milk. No swallowing difficulties.   Plan---dc with boyfriend. He will spend the night tonight.   On " "remote tele for HR in 40's. Sinus leeroy 45    PRIMARY DIAGNOSIS/PROCEDURE: anterior cervical decompression and fusion C6-7  OUTPATIENT/OBSERVATION GOALS TO BE MET BEFORE DISCHARGE:    1. Stable vital signs Yes, leeroy, on remote tele, sinus leeroy  2. Tolerating diet:Yes, nausea relived with zofran and compazine, denies nausea at 2200  3. Pain controlled with oral pain medications:  Yes, taking dilaudid, oxycodone, flexeril, rates pain level a 6 with meds. Has a migraine, \"better\" by 2100.   4. Positive bowel sounds:  Yes, +flatus, lbm was today. Had x1 emesis of 100cc. On full liq diet.   5. Voiding without difficulty:  No--leonard in place. Has not had the chance to void.   6. Able to ambulate:  Yes. Sat up in chair for 10-15 min for supper. Took a few steps from bed to chair, pain high most off shift due to neck pain and migraine.   7. Provider specific discharge goals met:  No, will need to void, will need pain under control with oral pain meds, still has hemovac in place, and migraine to be gone            "

## 2017-07-21 NOTE — PLAN OF CARE
Problem: Goal Outcome Summary  Goal: Goal Outcome Summary  Outcome: No Change  AO, VSS- leeroy Rajput notified-hospitalist consult place and paged. PRN morphine given for pain-states better but CO of itching-paged to change pain medication. Walked from cart to bed tolerated well. Dressing CDI, cms intact. hemovac decompressed dark red drainged. Up sba.

## 2017-07-21 NOTE — OR NURSING
"GERMÁN - Matt to bedside re Bradycardia, patient states this is normal for her after surgery. She is Asymptomatic, BP stable 146/68. Heart monitor shows always sinus-leeroy to normal sinus, always with a P wave. No new orders and ok\"d to dc to floor.  "

## 2017-07-21 NOTE — CONSULTS
[facility]    Hospitalist Consultation    Date of Admission:  7/21/2017  Date of Consult (When I saw the patient): 07/21/17    Provider: Bryan Chase MD    Assessment & Plan   Brittney Roman is a 52 year old female who was admitted on 7/21/2017. I was asked to see the patient for medical management of chronic conditions after neck surgery.     1. Cervical stenosis radiculopathy s/p fusion at C 6 - 7.   2. Asymptomatic episode of bradycardia briefly documented in telemetry.   3. Migraine headache.  4. History of stroke in 2006. Per patient report it was cryptogenic  5. Unclear hx of elevated blood pressure.  6. Active smoker  7. Surgically induced menopause.   8. Chronic pain syndrome.     Plan.   1. Defer pain control, post surgical cares and rehab of cervical intervention to primary team.  2. I will recomend watchful waiting regarding heart rate. I do not have a clinical explanation except that it might be a pain induced vagal reaction but it is just a speculation. It is not clear if this episode has any clinical significance. Telemetry surveillance is in my opinion the best approach at this moment.  3. Resume home med migraine medication. She is having on top pain medication prescribed for control of surgical pain.   4. I will check lipid panel fasting in AM looking for vascular risks factors..  5. Follow up BP trend. Use Apresoline prn. Consider ACEi tomorrow (no BB or Ca CB by now given recent leeroy).  6. Advised to quit, declines Nicotine supp.  7. In case of challenging pain control I would consult Pain team  .   DVT Prophylaxis: Defer to primary service    Code Status: Full Code    Disposition: Expected discharge per primary care.     Thank you so much for the opportunity of this consultation. I or one of my colleagues will follow along with you. Please contact me if you have any question regarding the plan of care.      Primary Care Physician   Tino Hayes    Chief Complaint   Chronic  neck pain     History is obtained from the patient, electronic health record and emergency department physician    History of Present Illness   Brittney Roman is a 52 year old female with PMH of migraine headache, stroke, possible hypertension and chronic pain syndrome who presents for elective cervical fusion. She underwent surgery for cervical stenosis/radiculopathy C 6-7 without complications. No nauseas, vomiting or dyspnea. She has been complaining of neck pain and asking for pain medication. At the moment of my visit she is reporting poor control and high level of pain. Patient is having a snack though. She takes a break to answer my question and allows my exam. I have been notified before my arrival that at some point her heart rate has been documented around 39 in the monitor. Patient has been sitting next to the bed and totally asymptomatic. As noted above her symptoms are related to surgical intervention and chronic migraine. NO clinical evidence of brain hypoperfusion in the setting of bradycardia. According to the patient she had a stroke back in 2006 when she lived in [place]. She was 41 at the time. She states that no cause was found and she has not been in any preventive medication afterward. Base on her report she had a right sided hemiplegia, currently the only visible sequeal is a facial hemiparesis. She denies to have hypertension but at the same time she admits to occasionallly taking BP medications.       Past Medical History    I have reviewed this patient's medical history and updated it with pertinent information if needed.   Past Medical History:   Diagnosis Date     Arthritis     knees, elbows and hands     Cerebral artery occlusion with cerebral infarction (H)     right facial paralysis as a result     Migraines      Other chronic pain     back       Past Surgical History   I have reviewed this patient's surgical history and updated it with pertinent information if needed.  Past Surgical  History:   Procedure Laterality Date     APPENDECTOMY       ARTHROPLASTY HIP Right      HYSTERECTOMY TOTAL ABDOMINAL, BILATERAL SALPINGO-OOPHORECTOMY, COMBINED       TONSILLECTOMY            Prior to Admission Medications   Prior to Admission Medications   Prescriptions Last Dose Informant Patient Reported? Taking?   SUMAtriptan (IMITREX) 6 MG/0.5ML injection 7/14/2017  Yes Yes   Sig: Inject 6 mg Subcutaneous every 4 hours as needed for migraine (Max 2 doses in 24 hours)   ZONISAMIDE PO 7/20/2017 at Unknown time  Yes Yes   Sig: Take 25 mg by mouth 2 times daily   acetaminophen (TYLENOL) 500 MG tablet 7/20/2017 at Unknown time  Yes Yes   Sig: Take 1,000 mg by mouth every 6 hours as needed for mild pain Max 4000 mg per day from all sources.   albuterol (PROAIR HFA/PROVENTIL HFA/VENTOLIN HFA) 108 (90 BASE) MCG/ACT Inhaler 7/20/2017 at Unknown time  Yes Yes   Sig: Inhale 2 puffs into the lungs every 4 hours as needed for shortness of breath / dyspnea or wheezing   ibuprofen (ADVIL/MOTRIN) 800 MG tablet 7/16/2017  Yes Yes   Sig: Take 1,600 mg by mouth every 6 hours as needed for moderate pain Yes, patient said she was taking 2 x 800 mg tablets, per her MD. Counseled that 800 mg is usual maximum dose.   pantoprazole (PROTONIX) 40 MG EC tablet 7/20/2017 at Unknown time  Yes Yes   Sig: Take 40 mg by mouth daily      Facility-Administered Medications: None     Allergies   Allergies   Allergen Reactions     Codeine Hives     Invega [Paliperidone]      Levaquin [Levofloxacin]      Ramipril Other (See Comments)     confusion     Seroquel [Quetiapine] Rash       Social History   I have personally reviewed the social history with the patient showing she is active smoker and seldom drinks alcohol per her report.    Family History    HTN, Dyslipidemia and stroke in several members      Review of Systems   Ten points ROS done and pertinent as per HPI, otherwise negative    Physical Exam   Temp: 96.8  F (36  C) Temp src: Oral BP:  159/65   Heart Rate: 44 Resp: 16 SpO2: 95 % O2 Device: None (Room air) Oxygen Delivery: 10 LPM  Vital Signs with Ranges  Temp:  [96.3  F (35.7  C)-97.2  F (36.2  C)] 96.8  F (36  C)  Heart Rate:  [40-62] 44  Resp:  [8-28] 16  BP: (120-180)/() 159/65  FiO2 (%):  [100 %] 100 %  SpO2:  [91 %-100 %] 95 %  164 lbs 0 oz    GEN:  Alert, oriented x 3, appears comfortable, NAD.  HEENT: Cervical collar in place with drain in place (bloody return in bag). Normocephalic/atraumatic, no scleral icterus, no nasal discharge, mouth moist.  CV:  Regular rate and rhythm, no murmur or JVD.  S1 + S2 noted, no S3 or S4.  LUNGS:  Clear to auscultation bilaterally without rales/rhonchi/wheezing/retractions.  Symmetric chest rise on inhalation noted.  ABD:  Active bowel sounds, soft, non-tender/non-distended.  No rebound/guarding/rigidity.  EXT:  No edema or cyanosis.  No joint synovitis noted.  SKIN:  Dry to touch, no exanthems noted in the visualized areas.     Data   -Data reviewed today: All pertinent laboratory and imaging results from this encounter were reviewed. I personally reviewed the EKG tracing showing NSR (with brief epsiode of sinus leeroy).  No lab results found in last 7 days.    Recent Results (from the past 24 hour(s))   XR Surgery NORBERT L/T 5 Min Fluoro w Stills    Narrative    This exam was marked as non-reportable because it will not be read by a   radiologist or a [facility] non-radiologist provider.                   Disclaimer: This note consists of symbols derived from keyboarding, dictation and/or voice recognition software. As a result, there may be errors in the script that have gone undetected. Please consider this when interpreting information found in this chart.

## 2017-07-21 NOTE — BRIEF OP NOTE
[facility] Brief Operative Note    Pre-operative diagnosis: Stenosis, radiculopathy   Post-operative diagnosis cervical stenosis C6 C7     Procedure: Procedure(s):  C6-7 Anterior Cervical Decompression and Fusion  - Wound Class: I-Clean   Surgeon(s): Surgeon(s) and Role:     * Cirilo Rajput MD - Primary     * Gilmar Mccain APRN CNP - Assisting   Estimated blood loss: 20 mL    Specimens: * No specimens in log *   Findings: Stenosis spur left C6 c7

## 2017-07-21 NOTE — IP AVS SNAPSHOT
[facility]    [address]    [facility] [address]    Phone:  [phone]    Fax:  [phone]                                       After Visit Summary   7/21/2017    Brittney Roman    MRN: [id number]           After Visit Summary Signature Page     I have received my discharge instructions, and my questions have been answered. I have discussed any challenges I see with this plan with the nurse or doctor.    ..........................................................................................................................................  Patient/Patient Representative Signature      ..........................................................................................................................................  Patient Representative Print Name and Relationship to Patient    ..................................................               ................................................  Date                                            Time    ..........................................................................................................................................  Reviewed by Signature/Title    ...................................................              ..............................................  Date                                                            Time

## 2017-07-21 NOTE — OR NURSING
Pt with low HR - lowest of 33 seen.  Sinus leeroy.  GERMÁN Gutierrez at bedside, BP stable.  Watch for now.  No new orders.  Pt asymptomatic with low HR

## 2017-07-21 NOTE — PROVIDER NOTIFICATION
Pt is a fresh surgical with c6-c7 anterior cervical decompression fusion. Pt is leeroy mid 40's occasional high 30's then goes to 50's. Do we want to place pt on tele. thanks

## 2017-07-21 NOTE — ANESTHESIA POSTPROCEDURE EVALUATION
Patient: Brittney Roman    Procedure(s):  C6-7 Anterior Cervical Decompression and Fusion  - Wound Class: I-Clean    Diagnosis:Stenosis, radiculopathy  Diagnosis Additional Information: Pre-operative diagnosis: Stenosis, radiculopathy  Post-operative diagnosis cervical stenosis C6 C7    Procedure: Procedure(s):  C6-7 Anterior Cervical Decompression and Fusion  - Wound Class: I-Clean  Surgeon(s): Surgeon(s) and Role:     * Cirilo Rajput Sik, MD - Primary     * Gilmar Mccain APRN CNP - Assisting  Estimated blood loss: 20 mL                  Specimens: * No specimens in log *  Findings: Stenosis spur left C6 c7         Anesthesia Type:  General, ETT    Note:  Anesthesia Post Evaluation    Patient location during evaluation: PACU  Patient participation: Able to fully participate in evaluation  Level of consciousness: awake  Pain management: adequate  Airway patency: patent  Cardiovascular status: acceptable  Respiratory status: acceptable  Hydration status: acceptable  PONV: none     Anesthetic complications: None          Last vitals:  Vitals:    07/21/17 1140 07/21/17 1142 07/21/17 1145   BP: 161/89  147/88   Resp: 9  14   Temp:      SpO2: 100% 100% 100%         Electronically Signed By: Matt Koenig MD  July 21, 2017  11:58 AM

## 2017-07-21 NOTE — ANESTHESIA CARE TRANSFER NOTE
Patient: Brittney Roman    Procedure(s):  C6-7 Anterior Cervical Decompression and Fusion  - Wound Class: I-Clean    Diagnosis: Stenosis, radiculopathy  Diagnosis Additional Information: No value filed.    Anesthesia Type:   General, ETT     Note:  Airway :Face Mask  Patient transferred to:PACU  Comments: vss      Vitals: (Last set prior to Anesthesia Care Transfer)    CRNA VITALS  7/21/2017 1056 - 7/21/2017 1139      7/21/2017             Pulse: 75    SpO2: 100 %                Electronically Signed By: AMARIS Umanzor CRNA  July 21, 2017  11:39 AM

## 2017-07-21 NOTE — PROVIDER NOTIFICATION
Pt CO morphine is making her itchy. Recieved dilaudid in pacu for pain, could we possibly switch? Has a codine allergy. Had surgery c6-c7 anterior cerival decompression and fusion thank you

## 2017-07-21 NOTE — ANESTHESIA PREPROCEDURE EVALUATION
Anesthesia Evaluation     . Pt has had prior anesthetic.     No history of anesthetic complications          ROS/MED HX    ENT/Pulmonary:     (+)tobacco use, Current use asthma , . .    Neurologic:     (+)migraines, CVA date: CVA vs bells palsy other neuro     Cardiovascular:         METS/Exercise Tolerance:     Hematologic:         Musculoskeletal:         GI/Hepatic:     (+) Other GI/Hepatic opiod depend.      Renal/Genitourinary:         Endo:         Psychiatric:     (+) psychiatric history anxiety and schizophrenia      Infectious Disease:         Malignancy:         Other:    (+) H/O Chronic Pain,                   Physical Exam  Normal systems: cardiovascular and pulmonary    Airway   Mallampati: II  TM distance: >3 FB  Neck ROM: limited    Dental   (+) upper dentures and lower dentures    Cardiovascular       Pulmonary                     Anesthesia Plan      History & Physical Review  History and physical reviewed and following examination; no interval change.    ASA Status:  3 .    NPO Status:  > 8 hours    Plan for General and ETT with Intravenous and Propofol induction. Maintenance will be Balanced.    PONV prophylaxis:  Ondansetron (or other 5HT-3) and Dexamethasone or Solumedrol  Additional equipment: Videolaryngoscope      Postoperative Care  Postoperative pain management:  IV analgesics.      Consents  Anesthetic plan, risks, benefits and alternatives discussed with:  Patient.  Use of blood products discussed: Yes.   Use of blood products discussed with Patient.  Consented to blood products.  .                          .

## 2017-07-22 ENCOUNTER — APPOINTMENT (OUTPATIENT)
Dept: CARDIOLOGY | Facility: CLINIC | Age: 52
End: 2017-07-22
Attending: HOSPITALIST
Payer: MEDICAID

## 2017-07-22 ENCOUNTER — APPOINTMENT (OUTPATIENT)
Dept: PHYSICAL THERAPY | Facility: CLINIC | Age: 52
End: 2017-07-22
Attending: ORTHOPAEDIC SURGERY
Payer: MEDICAID

## 2017-07-22 VITALS
HEIGHT: 66 IN | TEMPERATURE: 98.5 F | SYSTOLIC BLOOD PRESSURE: 106 MMHG | WEIGHT: 164 LBS | DIASTOLIC BLOOD PRESSURE: 64 MMHG | RESPIRATION RATE: 16 BRPM | OXYGEN SATURATION: 96 % | BODY MASS INDEX: 26.36 KG/M2

## 2017-07-22 LAB
CHOLEST SERPL-MCNC: 262 MG/DL
GLUCOSE SERPL-MCNC: 97 MG/DL (ref 70–99)
HDLC SERPL-MCNC: 52 MG/DL
LDLC SERPL CALC-MCNC: 173 MG/DL
NONHDLC SERPL-MCNC: 210 MG/DL
TRIGL SERPL-MCNC: 186 MG/DL

## 2017-07-22 PROCEDURE — 99207 ZZC CDG-CODE CATEGORY CHANGED: CPT | Performed by: HOSPITALIST

## 2017-07-22 PROCEDURE — 40000264 ECHO COMPLETE WITH OPTISON

## 2017-07-22 PROCEDURE — 25000125 ZZHC RX 250: Performed by: ORTHOPAEDIC SURGERY

## 2017-07-22 PROCEDURE — G0378 HOSPITAL OBSERVATION PER HR: HCPCS

## 2017-07-22 PROCEDURE — 25000128 H RX IP 250 OP 636: Performed by: INTERNAL MEDICINE

## 2017-07-22 PROCEDURE — 99225 ZZC SUBSEQUENT OBSERVATION CARE,LEVEL II: CPT | Performed by: HOSPITALIST

## 2017-07-22 PROCEDURE — 93306 TTE W/DOPPLER COMPLETE: CPT | Mod: 26 | Performed by: INTERNAL MEDICINE

## 2017-07-22 PROCEDURE — 80061 LIPID PANEL: CPT | Performed by: HOSPITALIST

## 2017-07-22 PROCEDURE — 25000132 ZZH RX MED GY IP 250 OP 250 PS 637: Performed by: ORTHOPAEDIC SURGERY

## 2017-07-22 PROCEDURE — 36415 COLL VENOUS BLD VENIPUNCTURE: CPT | Performed by: HOSPITALIST

## 2017-07-22 PROCEDURE — 40000193 ZZH STATISTIC PT WARD VISIT

## 2017-07-22 PROCEDURE — 82947 ASSAY GLUCOSE BLOOD QUANT: CPT | Performed by: HOSPITALIST

## 2017-07-22 PROCEDURE — 97161 PT EVAL LOW COMPLEX 20 MIN: CPT | Mod: GP

## 2017-07-22 PROCEDURE — 25500064 ZZH RX 255 OP 636: Performed by: HOSPITALIST

## 2017-07-22 RX ORDER — ATORVASTATIN CALCIUM 20 MG/1
20 TABLET, FILM COATED ORAL DAILY
Qty: 30 TABLET | Refills: 1 | Status: SHIPPED | OUTPATIENT
Start: 2017-07-22

## 2017-07-22 RX ORDER — AMLODIPINE BESYLATE 10 MG/1
5 TABLET ORAL DAILY
Qty: 30 TABLET | Refills: 1 | Status: SHIPPED | OUTPATIENT
Start: 2017-07-22

## 2017-07-22 RX ORDER — OXYCODONE HYDROCHLORIDE 5 MG/1
5-10 TABLET ORAL EVERY 4 HOURS PRN
Qty: 40 TABLET | Refills: 0 | Status: SHIPPED | OUTPATIENT
Start: 2017-07-22

## 2017-07-22 RX ADMIN — OXYCODONE HYDROCHLORIDE 10 MG: 5 TABLET ORAL at 08:48

## 2017-07-22 RX ADMIN — PANTOPRAZOLE SODIUM 40 MG: 40 TABLET, DELAYED RELEASE ORAL at 07:51

## 2017-07-22 RX ADMIN — OXYCODONE HYDROCHLORIDE 10 MG: 5 TABLET ORAL at 00:57

## 2017-07-22 RX ADMIN — OXYCODONE HYDROCHLORIDE 10 MG: 5 TABLET ORAL at 14:34

## 2017-07-22 RX ADMIN — Medication 0.5 MG: at 12:44

## 2017-07-22 RX ADMIN — ZONISAMIDE 25 MG: 25 CAPSULE ORAL at 06:01

## 2017-07-22 RX ADMIN — OXYCODONE HYDROCHLORIDE 10 MG: 5 TABLET ORAL at 17:22

## 2017-07-22 RX ADMIN — OXYCODONE HYDROCHLORIDE 10 MG: 5 TABLET ORAL at 04:07

## 2017-07-22 RX ADMIN — ACETAMINOPHEN 975 MG: 325 TABLET, FILM COATED ORAL at 06:01

## 2017-07-22 RX ADMIN — CYCLOBENZAPRINE HYDROCHLORIDE 10 MG: 10 TABLET, FILM COATED ORAL at 17:22

## 2017-07-22 RX ADMIN — CYCLOBENZAPRINE HYDROCHLORIDE 10 MG: 10 TABLET, FILM COATED ORAL at 07:51

## 2017-07-22 RX ADMIN — ACETAMINOPHEN 975 MG: 325 TABLET, FILM COATED ORAL at 13:07

## 2017-07-22 RX ADMIN — Medication 0.5 MG: at 10:48

## 2017-07-22 RX ADMIN — POTASSIUM CHLORIDE AND SODIUM CHLORIDE: 900; 150 INJECTION, SOLUTION INTRAVENOUS at 04:07

## 2017-07-22 RX ADMIN — HUMAN ALBUMIN MICROSPHERES AND PERFLUTREN 3 ML: 10; .22 INJECTION, SOLUTION INTRAVENOUS at 10:45

## 2017-07-22 ASSESSMENT — PAIN DESCRIPTION - DESCRIPTORS
DESCRIPTORS: THROBBING
DESCRIPTORS: THROBBING

## 2017-07-22 NOTE — PROGRESS NOTES
Vitals stable.   Bradycardia resolved.   Some residual left thumb numbness no real pain.  Eating well.  Ready to go home today.

## 2017-07-22 NOTE — PROGRESS NOTES
SPIRITUAL HEALTH SERVICES Progress Note  [facility] : Ortho-Spine, 627    Visited pt Brittney per her request for  visit. Pt expressed appreciation of the doctors and nurses care at [facility] and said that her surgery went well. She has her significant other and her children as her support system. Pt said that she is likely to get discharged either today or tomorrow and she plans to go home. She grew up S[employer]ism, and later on attended a [employer]. Right now she is in the process of finding a [employer] that will gather to her spiritual needs. Brittney welcomed scripture reading and prayer. No further needs were expressed.  remain available per pt request.    Oleg Salcedo   Intern  [phone]

## 2017-07-22 NOTE — PLAN OF CARE
Problem: Goal Outcome Summary  Goal: Goal Outcome Summary  Outcome: Improving  AO, VSS-leeroy. Dressing CDI. Pain managed by PRN medication. Voiding approp, bs +. Awaiting echo results, then possible discharging depending on this.

## 2017-07-22 NOTE — PLAN OF CARE
Discharge Planner PT   Patient plan for discharge: home  Current status: independent with mobility  Barriers to return to prior living situation: none  Recommendations for discharge: home with assist from spouse as needed  Rationale for recommendations: patient independent with mobility including amb 200 feet with 2WW       Entered by: Jackelyn Bronson 07/22/2017 6:03 PM

## 2017-07-22 NOTE — PROVIDER NOTIFICATION
FYI: Pt is still leeroy lowest mid 30's now experiencing frequent pauses longest lasting  2.3 sec -asymptomatic. Echo is pending thank you.

## 2017-07-22 NOTE — PROGRESS NOTES
[facility]    Hospitalist Progress Note    Date of Service (when I saw the patient): 07/22/2017  Provider:  Bryan Chase MD     Initial presenting complaint/issue to hospital (Diagnosis): elective cervical fusion.     Assessment & Plan   Anatoly Roman is a 52 year old female who was admitted on 7/21/2017. I was asked to see the patient for medical management of chronic conditions after neck surgery.      1. Cervical stenosis radiculopathy s/p fusion at C 6 - 7.   2. Asymptomatic episode of bradycardia briefly documented in telemetry. Past hx of bradycardia, apparently had home monitor and coronary angio at [place], [place]. Negative results  3. Migraine headache.  4. History of stroke in 2006. Per patient report it was cryptogenic  5. Unclear hx of elevated blood pressure.  6. Active smoker  7. Surgically induced menopause.   8. Chronic pain syndrome.      Plan.   1. Defer pain control, post surgical cares and rehab of cervical intervention to primary team.  2. Watchful waiting regarding heart rate. I do not have a clinical explanation except that it might be a pain induced vagal reaction but it is just a speculation. No clear clinical significance. Telemetry surveillance has revealed sinus leeroy w/o significant arrythmia moment. Echo with mild LVH and grade II diastolic dysfunction.   3. Home med for migraine medication. She is having also pain medication prescribed for control of surgical pain.   4. Abnormal lipid panel fasting. Start Atorvastatin given personal and family hx. looking for vascular risks factors..  5. Follow up BP trend. Use Apresoline prn. Norvasc d/t allergy to Ramipril. She needs follow up in office.     6. Advised to quit, declines Nicotine supp.      DVT Prophylaxis: Defer to primary service  Code Status: Prior    Disposition: Expected discharge as per primary team. Recommendation to start BP medication (Norvasc ) and Atorvastatin. She needs strict follow up by her PCP. .    Interval  History   She feels better. She has been discharged by surgeon. Results discussed with patient in presence of RN. She agrees to start Norvasc and Atorvastatin and follow up with her PCP.     -Data reviewed today: I reviewed all new labs and imaging results over the last 24 hours. I personally reviewed the EKG tracing showing Sinus leeroy in monitor ~ 59'. .    Physical Exam   Temp: 98.5  F (36.9  C) Temp src: Oral BP: 106/64   Heart Rate: 45 Resp: 16 SpO2: 96 % O2 Device: None (Room air)    Vitals:    07/19/17 1200 07/21/17 0653   Weight: 75.8 kg (167 lb) 74.4 kg (164 lb)     Vital Signs with Ranges  Temp:  [97.2  F (36.2  C)-98.7  F (37.1  C)] 98.5  F (36.9  C)  Heart Rate:  [41-63] 45  Resp:  [12-17] 16  BP: (106-181)/(40-90) 106/64  SpO2:  [94 %-98 %] 96 %  I/O last 3 completed shifts:  In: 1450 [P.O.:1000; I.V.:450]  Out: 2455 [Urine:2350; Emesis/NG output:100; Drains:5]    GEN:  Alert, oriented x 3, appears comfortable, NAD.  HEENT: Cervical collar in place with drain in place (bloody return in bag). Normocephalic/atraumatic, no scleral icterus, no nasal discharge, mouth moist.  CV:  Regular rate and rhythm, no murmur or JVD.  S1 + S2 noted, no S3 or S4.  LUNGS:  Clear to auscultation bilaterally without rales/rhonchi/wheezing/retractions.  Symmetric chest rise on inhalation noted.  ABD:  Active bowel sounds, soft, non-tender/non-distended.  No rebound/guarding/rigidity.  EXT:  No edema or cyanosis.  No joint synovitis noted.  SKIN:  Dry to touch, no exanthems noted in the visualized areas.       .     Medications     0.9% sodium chloride + KCl 20 mEq/L 75 mL/hr at 07/22/17 0407       sodium chloride (PF)  10 mL Intracatheter Q8H     pantoprazole  40 mg Oral Daily     zonisamide (Zonegran) capsule 25 mg  25 mg Oral BID     sodium chloride (PF)  3 mL Intracatheter Q8H     acetaminophen  975 mg Oral Q8H       Data     Recent Labs  Lab 07/22/17  0611   GLC 97       No results found for this or any previous visit  (from the past 24 hour(s)).          Disclaimer: This note consists of symbols derived from keyboarding, dictation and/or voice recognition software. As a result, there may be errors in the script that have gone undetected. Please consider this when interpreting information found in this chart.

## 2017-07-22 NOTE — PLAN OF CARE
Problem: Goal Outcome Summary  Goal: Goal Outcome Summary  Outcome: Adequate for Discharge Date Met:  07/22/17  Lungs---clear, -cough, mid 90's oxygen sats, using IS upto 1000.    BS---+bs, +flatus, lbm was yesterday prior to surgery.  Diet---reg, soft, no swallowing difficulties  ---voiding adequately  CMS---numbness in L hand 3rd, 4th and 5th fingertips. +radial pulses, strong hand grasp, drsg dry and intact. Collar on. Ice bag sent home with pt  Pain---taking oxycodone for pain control. Gave oxycodone prior to dischage. Pt has a 3. 5 hours drive home. Rates pain level a 5-6. Goal is a 3. Migraine is gone. Oxycodone was filled and sent home with pt.   Activity---up walking with sba/independently. Wanted to walk down to front doors. Assisted with NA.   Plan---home with boyfrienjesus manuel Martinez at 1720     OBSERVATION patient END time: 1720     PRIMARY DIAGNOSIS/PROCEDURE: ACDF c6-c7  OUTPATIENT/OBSERVATION GOALS TO BE MET BEFORE DISCHARGE:     1. Stable vital signs Yes  2. Tolerating diet:Yes reg soft diet, no swallowing difficulties  3. Pain controlled with oral pain medications:  Yes, took oxycodone and flexeril prior to drive home.   4. Positive bowel sounds:  Yes, +flatus, last bm yesterday  5. Voiding without difficulty:  Yes  6. Able to ambulate:  Yes  7. Provider specific discharge goals met:  Yes

## 2017-07-22 NOTE — PLAN OF CARE
Problem: Goal Outcome Summary  Goal: Goal Outcome Summary  A/O, VSS pain 5/10 oxycodone for pain relief able to sleep in between cares.  Denies numbness/tingling.  Denies nausea.  HR leeroy on remote tele will have an echo done today.  Short catheter dc'd due to void.  Plan is to go home today.

## 2017-07-22 NOTE — PLAN OF CARE
Problem: Goal Outcome Summary  Goal: Goal Outcome Summary  OT: Per PT, no OT needs at this time, will complete OT order

## 2017-07-22 NOTE — DISCHARGE SUMMARY
This patient was admitted for acdf at C6 C7 on 7/21.  Postop she had episode of bradycardia.  Hospitalist consult.  With observation it resolved.  Possible vasovagal.  Did well from the surgical point of view and was sent home the following day on 7/22 on oxycodone.  Further followup at [facility].

## 2017-07-22 NOTE — PROGRESS NOTES
07/22/17 0955   Quick Adds   Type of Visit Initial PT Evaluation   Living Environment   Lives With significant other   Living Arrangements house   Number of Stairs to Enter Home 3   Stair Railings at Home outside, present on left side   Transportation Available car;family or friend will provide   Living Environment Comment Patient lives with spouse in mobile home   Self-Care   Usual Activity Tolerance moderate   Current Activity Tolerance fair   Regular Exercise no   Equipment Currently Used at Home cane, straight;hospital bed;raised toilet;shower chair;walker, rolling   Activity/Exercise/Self-Care Comment Patient reports that she has medical equipment left over from previous surgery   Functional Level Prior   Ambulation 0-->independent   Transferring 0-->independent   Toileting 0-->independent   Bathing 0-->independent   Dressing 0-->independent   Eating 0-->independent   Communication 0-->understands/communicates without difficulty   Swallowing 0-->swallows foods/liquids without difficulty   Cognition 0 - no cognition issues reported   Fall history within last six months no   Which of the above functional risks had a recent onset or change? none   General Information   Patient/Family Goals Statement return to home   Pertinent History of Current Problem (include personal factors and/or comorbidities that impact the POC) patient was admitted for acdf at C6 C7 on 7/21   Precautions/Limitations spinal precautions   Cognitive Status Examination   Orientation orientation to person, place and time   Personal Safety and Judgment intact   Memory intact   Pain Assessment   Patient Currently in Pain Yes, see Vital Sign flowsheet   Integumentary/Edema   Integumentary/Edema Comments mild edema at surgical site   Posture    Posture Not impaired   Range of Motion (ROM)   ROM Comment WFL, did not assess cervical ROM   Strength   Strength Comments mild strength deficits, WFL   Bed Mobility   Bed Mobility Comments independent  "  Transfer Skills   Transfer Comments independent   Gait   Gait Comments amb 250 feet, independent   Balance   Balance Comments no LOB noted   Coordination   Coordination no deficits were identified   Muscle Tone   Muscle Tone no deficits were identified   Clinical Impression   Criteria for Skilled Therapeutic Intervention evaluation only   PT Diagnosis Patient independent with mobility following surgical procedure   Clinical Presentation Stable/Uncomplicated   Clinical Decision Making (Complexity) Low complexity   Therapy Frequency` (evaluation only)   Predicted Duration of Therapy Intervention (days/wks) evaluation only    Anticipated Discharge Disposition Home   Risk & Benefits of therapy have been explained Yes   Patient, Family & other staff in agreement with plan of care Yes   [facility] TM \"6 Clicks\"   2016, Trustees of [facility], under license to [employer].  All rights reserved.   6 Clicks Short Forms Basic Mobility Inpatient Short Form   [facility] AM-PAC  \"6 Clicks\" V.2 Basic Mobility Inpatient Short Form   1. Turning from your back to your side while in a flat bed without using bedrails? 4 - None   2. Moving from lying on your back to sitting on the side of a flat bed without using bedrails? 4 - None   3. Moving to and from a bed to a chair (including a wheelchair)? 4 - None   4. Standing up from a chair using your arms (e.g., wheelchair, or bedside chair)? 4 - None   5. To walk in hospital room? 4 - None   6. Climbing 3-5 steps with a railing? 4 - None   Basic Mobility Raw Score (Score out of 24.Lower scores equate to lower levels of function) 24   Total Evaluation Time   Total Evaluation Time (Minutes) 20     "

## 2017-07-24 NOTE — OP NOTE
Surgeon / Clinician: Cirilo Rajput MD    PREOPERATIVE DIAGNOSIS:  Cervical stenosis C5-C6 left, with severe left C7 radiculopathy.    POSTOPERATIVE DIAGNOSIS:  Cervical stenosis C5-C6 left, with severe left C7 radiculopathy.    SURGEON:  Cirilo Rajput MD    FIRST ASSISTANT:  Gilmar Arrieta CNP    PROCEDURES PERFORMED:    1.  Anterior cervical decompression and fusion at C6-C7.  2.  [employer] cage with DBX DBM 8 mm height.  3.  Anterior instrumentation using 25 mm anterior [employer] cervical plate using 16 mm self-drilling screws.    BLOOD LOSS:  20 mL.    COMPLICATIONS:  None.    ANESTHESIA:  General    OPERATING TIME:  1-1/2 hours.    INDICATION:  The patient is a 52-year-old woman with a persistent left cervical radiculopathy, not responsive to conservative measures including injections.  The patient opted for definitive surgical intervention.  The nature of surgery and the risks were discussed.  The patient opted to proceed.      DESCRIPTION OF PROCEDURE:  The patient was brought to the operating theater.  General endotracheal anesthesia was induced in an uneventful manner.  Timeout was called.  Prophylactic antibiotic was given and SCDs applied.  The patient was hooked up for intraoperative SSEP/EMG monitoring.  Shoulders taped down.  Neck was kept in neutral position.  Lateral C-arm fluoroscopy was brought in.  Appropriate level of incision was marked off on the left side of the cervical spine after routine prep and drape.  A transverse incision was made.  Skin was incised.  Subcutaneous tissue was incised.  Blunt dissection was performed, then anterior aspect of the cervical spine was exposed.  Spinal needle was inserted to confirm the correct level of dissection.  The longus colli muscle ___ elevated and the self-retaining retractor was then set.  The C6, C7 space was worked on.  Anterior spur was removed, followed by drilling into the semi-collapsed disk space and removing all the disk material down to the PLL.   PLL was taken down.  Dura was exposed.  A generous foraminotomy into the left C6-C7 foramen was then carried out, taking out the uncinate spur following the exiting left C7 nerve root.  Good decompression was achieved.  Distraction pin was inserted.  Interspace was then shaped into ___ bleeding surface, and the spacer 8 mm height fit quite snuggly.  This was removed and replaced with 8 mm height PEEK cage from [employer] packed with DBX DBM.  This was countersunk.  This was followed by application of a 25 mm anterior titanium cervical plate from [employer] using four 16 mm self-drilling screws.  Each screw had a firm purchase.  Locking nut was tightened down.  Lateral C-arm fluoroscopy showed good positioning of the plate, screws, and, cages.  The wound was irrigated with antibiotic-saturated solution.  One Hemovac line was pulled out from the wound in the neck.  Skin was approximated using 4-0 Vicryl sutures.  Steri-Strips applied.  Sterile dressing was applied.  The patient's neck was placed into [place] neck collar.  The patient was extubated.  The patient was taken to the recovery room in good condition.  The patient tolerated the procedure well.        Cirilo Rajput MD    D:  07/21/2017 12:34 T:  07/24/2017 04:10  Document:  [id number]

## 2017-07-25 LAB — INTERPRETATION ECG - MUSE: NORMAL

## (undated) DEVICE — DRAPE MAYO STAND 23X54 8337

## (undated) DEVICE — SUCTION MANIFOLD NEPTUNE 2 SYS 4 PORT 0702-020-000

## (undated) DEVICE — DRSG TEGADERM 2 3/8X2 3/4" 1624W

## (undated) DEVICE — ESU ELEC NDL 1" COATED/INSULATED E1465

## (undated) DEVICE — SOL NACL 0.9% IRRIG 1000ML BOTTLE 2F7124

## (undated) DEVICE — PIN DISTRACTION ANCHOR FOR SCR 14MM MDS9091414

## (undated) DEVICE — SOL WATER IRRIG 1000ML BOTTLE 2F7114

## (undated) DEVICE — GLOVE PROTEXIS POWDER FREE 6.5 ORTHOPEDIC 2D73ET65

## (undated) DEVICE — SYR 20ML LL W/O NDL

## (undated) DEVICE — CATH TRAY FOLEY SURESTEP 16FR W/URINE MTR STATLK LF A303416A

## (undated) DEVICE — GLOVE PROTEXIS POWDER FREE 8.0 ORTHOPEDIC 2D73ET80

## (undated) DEVICE — PACK SMALL SPINE RIDGES

## (undated) DEVICE — NDL ANGIOCATH 14GA 1.25" 3068

## (undated) DEVICE — BONE WAX 2.5GM W31G

## (undated) DEVICE — SPONGE SURGIFOAM 01GM POWDER 1978

## (undated) DEVICE — GLOVE PROTEXIS BLUE W/NEU-THERA 8.0  2D73EB80

## (undated) DEVICE — LINEN HALF SHEET 5512

## (undated) DEVICE — CATH IV ANGIO INTRO 12GA 382277

## (undated) DEVICE — SU VICRYL 2-0 CT-2 27" UND J269H

## (undated) DEVICE — DRAPE IOBAN INCISE 23X17" 6650EZ

## (undated) DEVICE — NDL SPINAL 18GA 3.5" 405184

## (undated) DEVICE — BAG CLEAR TRASH 1.3M 39X33" P4040C

## (undated) DEVICE — DRSG TEGADERM 4X4 3/4" 1626W

## (undated) DEVICE — SPONGE KITTNER 30-101

## (undated) DEVICE — LINEN ORTHO ACL PACK 5447

## (undated) DEVICE — LINEN POUCH DBL 5427

## (undated) DEVICE — PREP DURAPREP 26ML APL 8630

## (undated) DEVICE — TUBING SUCTION 12"X1/4" N612

## (undated) DEVICE — GLOVE PROTEXIS BLUE W/NEU-THERA 7.0  2D73EB70

## (undated) DEVICE — TAPE DURAPORE 3" SILK 1538-3

## (undated) DEVICE — SU VICRYL 4-0 PS-2 18" UND J496H

## (undated) DEVICE — ESU CLEANER TIP 31142717

## (undated) DEVICE — DRAPE X-RAY TUBE 00-901169-01-OEC

## (undated) DEVICE — DECANTER BAG 2002S

## (undated) DEVICE — MIDAS REX DISSECTING TOOL  14MH30

## (undated) DEVICE — LINEN FULL SHEET 5511

## (undated) DEVICE — DRSG ADAPTIC 3X3" 6112

## (undated) DEVICE — ESU GROUND PAD ADULT W/CORD E7507

## (undated) DEVICE — BLADE KNIFE SURG 11 371111

## (undated) RX ORDER — FENTANYL CITRATE 50 UG/ML
INJECTION, SOLUTION INTRAMUSCULAR; INTRAVENOUS
Status: DISPENSED
Start: 2017-07-21

## (undated) RX ORDER — ACETAMINOPHEN 10 MG/ML
INJECTION, SOLUTION INTRAVENOUS
Status: DISPENSED
Start: 2017-07-21

## (undated) RX ORDER — GLYCOPYRROLATE 0.2 MG/ML
INJECTION INTRAMUSCULAR; INTRAVENOUS
Status: DISPENSED
Start: 2017-07-21

## (undated) RX ORDER — CEFAZOLIN SODIUM 2 G/100ML
INJECTION, SOLUTION INTRAVENOUS
Status: DISPENSED
Start: 2017-07-21

## (undated) RX ORDER — PROPOFOL 10 MG/ML
INJECTION, EMULSION INTRAVENOUS
Status: DISPENSED
Start: 2017-07-21

## (undated) RX ORDER — ONDANSETRON 2 MG/ML
INJECTION INTRAMUSCULAR; INTRAVENOUS
Status: DISPENSED
Start: 2017-07-21

## (undated) RX ORDER — BUPIVACAINE HYDROCHLORIDE AND EPINEPHRINE 2.5; 5 MG/ML; UG/ML
INJECTION, SOLUTION EPIDURAL; INFILTRATION; INTRACAUDAL; PERINEURAL
Status: DISPENSED
Start: 2017-07-21

## (undated) RX ORDER — HYDROMORPHONE HYDROCHLORIDE 1 MG/ML
INJECTION, SOLUTION INTRAMUSCULAR; INTRAVENOUS; SUBCUTANEOUS
Status: DISPENSED
Start: 2017-07-21

## (undated) RX ORDER — DEXAMETHASONE SODIUM PHOSPHATE 4 MG/ML
INJECTION, SOLUTION INTRA-ARTICULAR; INTRALESIONAL; INTRAMUSCULAR; INTRAVENOUS; SOFT TISSUE
Status: DISPENSED
Start: 2017-07-21

## (undated) RX ORDER — KETAMINE HYDROCHLORIDE 10 MG/ML
INJECTION, SOLUTION INTRAMUSCULAR; INTRAVENOUS
Status: DISPENSED
Start: 2017-07-21

## (undated) RX ORDER — NEOSTIGMINE METHYLSULFATE 5 MG/5 ML
SYRINGE (ML) INTRAVENOUS
Status: DISPENSED
Start: 2017-07-21

## (undated) RX ORDER — KETOROLAC TROMETHAMINE 30 MG/ML
INJECTION, SOLUTION INTRAMUSCULAR; INTRAVENOUS
Status: DISPENSED
Start: 2017-07-21

## (undated) RX ORDER — CEFAZOLIN SODIUM 1 G/3ML
INJECTION, POWDER, FOR SOLUTION INTRAMUSCULAR; INTRAVENOUS
Status: DISPENSED
Start: 2017-07-21